# Patient Record
Sex: MALE | Race: WHITE | NOT HISPANIC OR LATINO | Employment: FULL TIME | URBAN - METROPOLITAN AREA
[De-identification: names, ages, dates, MRNs, and addresses within clinical notes are randomized per-mention and may not be internally consistent; named-entity substitution may affect disease eponyms.]

---

## 2020-01-14 ENCOUNTER — OFFICE VISIT (OUTPATIENT)
Dept: URGENT CARE | Facility: CLINIC | Age: 70
End: 2020-01-14
Payer: COMMERCIAL

## 2020-01-14 VITALS
TEMPERATURE: 98 F | WEIGHT: 230 LBS | HEIGHT: 72 IN | HEART RATE: 63 BPM | BODY MASS INDEX: 31.15 KG/M2 | OXYGEN SATURATION: 98 % | RESPIRATION RATE: 16 BRPM | DIASTOLIC BLOOD PRESSURE: 78 MMHG | SYSTOLIC BLOOD PRESSURE: 136 MMHG

## 2020-01-14 DIAGNOSIS — S63.289A DISLOCATION OF FINGER PIP JOINT, INITIAL ENCOUNTER: Primary | ICD-10-CM

## 2020-01-14 DIAGNOSIS — S69.91XA INJURY OF RIGHT RING FINGER, INITIAL ENCOUNTER: ICD-10-CM

## 2020-01-14 PROCEDURE — 99202 PR OFFICE/OUTPT VISIT, NEW, LEVL II, 15-29 MIN: ICD-10-PCS | Mod: S$GLB,,, | Performed by: STUDENT IN AN ORGANIZED HEALTH CARE EDUCATION/TRAINING PROGRAM

## 2020-01-14 PROCEDURE — 73140 X-RAY EXAM OF FINGER(S): CPT | Mod: FY,RT,S$GLB, | Performed by: RADIOLOGY

## 2020-01-14 PROCEDURE — 99202 OFFICE O/P NEW SF 15 MIN: CPT | Mod: S$GLB,,, | Performed by: STUDENT IN AN ORGANIZED HEALTH CARE EDUCATION/TRAINING PROGRAM

## 2020-01-14 PROCEDURE — 73140 XR FINGER 2 OR MORE VIEWS: ICD-10-PCS | Mod: FY,RT,S$GLB, | Performed by: RADIOLOGY

## 2020-01-14 RX ORDER — IBUPROFEN 200 MG
400 TABLET ORAL
Status: COMPLETED | OUTPATIENT
Start: 2020-01-14 | End: 2020-01-14

## 2020-01-14 RX ADMIN — Medication 400 MG: at 12:01

## 2020-01-14 NOTE — PATIENT INSTRUCTIONS
Finger Dislocation  A finger dislocation occurs when the tissues, or ligaments, that hold the joint together are torn. The bones then move apart, or are dislocated, out of their normal position. This causes pain, swelling, and bruising. Sometimes there is also a small chip fracture. Once the joint is put back into place again, it will take about 6 weeks for the ligaments to heal. During this time, you should protect your finger from re-injury.     Buddy taping is a way to secure your injured finger to the one next to it. Buddy taping allows the joint to move. But it also protects it from dislocating again. Buddy tape can be left in place for up to 6 weeks.  Hand exercises may be prescribed at your follow-up visit. These can help speed healing and maintain function. In most cases you will regain full function of your finger. But it may take 12 to 18 months before all mild pain and swelling goes away and full function returns.  Home care  · Keep your hand raised, or elevated, to reduce pain and swelling. When sitting or lying down, raise your arm above the level of your heart. You can do this by placing your arm on a pillow that rests on your chest. Or your arm can be on a pillow at your side. This is most important during the first 48 hours after injury.  · Put an ice pack on the injured area for no more than 15 to 20 minutes every 3 to 6 hours. Do this for the first 24 to 48 hours. You can make an ice pack by wrapping a plastic Ziploc bag of ice cubes in a thin towel. As the ice melts, be careful that the tape, gauze, or splint doesnt get wet. After that, keep using ice as needed to ease pain and swelling.  · If you have a removable splint, you may take it off to bathe and then put it back on. If you have a permanent splint, cover your entire hand with 2 plastic bags. Place 1 bag around the other. Tape each bag with duct tape at the top end. Water can still leak in even when your hand is covered. So it's best to  keep the splint away from water. If a splint gets wet, you can dry it with a hair-dryer on a cool setting.   · If you use nani tape and it becomes wet or dirty, change it. You may replace it with paper, plastic, or cloth tape. Cloth tape and paper tapes must be kept dry. When re-applying nani tape, use gauze or cotton padding between your fingers. This will prevent the skin from getting moist and breaking down, or macerating. It is very important to put padding at the web space. This is the small piece of skin that joins the bases of your fingers. Keep the nani tape in place as instructed by your healthcare provider.  · You may use over-the-counter pain medicine to control pain, unless another pain medicine was prescribed. Talk with your provider before taking these medicines if you have chronic liver or kidney disease. Also talk with your provider if you have ever had a stomach ulcer or GI (gastrointestinal) bleeding.  · Do not play sports or do any physical exercise until your healthcare provider says that you can.  Follow-up care  Follow up with your healthcare provider in 1 week, or as advised. Splints should generally not be left in place longer than 3 weeks to avoid stiffness and loss of joint function. It is important that you see the referral doctor. This provider can determine how long to keep your splint in place and when to begin hand exercises.  If X-rays were taken, you will be told of any new findings that may affect your care.  When to seek medical advice  Call your healthcare provider right away if any of the following occur:  · The injured finger has more pain or swelling  · The injured finger becomes red or warm  · The injured finger becomes cold, blue, numb, or tingly  Date Last Reviewed: 11/23/2015  © 9572-8719 [employer]. [address] [place] [address], [place] [address]. All rights reserved. This information is not intended as a substitute for professional medical care. Always follow  your healthcare professional's instructions.

## 2020-01-14 NOTE — LETTER
January 14, 2020      [facility]  [address] [address]  Phone: [phone]  Fax: [phone]       Patient: Eric Yan   YOB: 1950  Date of Visit: 01/14/2020    To Whom It May Concern:    JOAO Yan  was at [facility] on 01/14/2020. He has a finger injury that requires a metallic finger brace for treatment and should not be removed. If you have any questions or concerns, or if I can be of further assistance, please do not hesitate to contact me.    Sincerely,    Moises Flores MD

## 2020-01-14 NOTE — PROGRESS NOTES
Subjective:       Patient ID: Eric Yan is a 69 y.o. male.    Vitals:  height is 6' (1.829 m) and weight is 104.3 kg (230 lb). His oral temperature is 98.3 °F (36.8 °C). His blood pressure is 136/78 and his pulse is 63. His respiration is 16 and oxygen saturation is 98%.     Chief Complaint: Finger Pain    Patient c/o right 4th finger pain that occurred yesterday after falling.  He has been taking OTC Ibuprofen for her pain, and states mild relief.  His last dose of Ibuprofen was 3 hours ago.    Finger Pain   This is a new problem. The current episode started yesterday. The problem occurs constantly. The problem has been unchanged. Associated symptoms include arthralgias and joint swelling. Pertinent negatives include no abdominal pain, anorexia, change in bowel habit, chest pain, chills, congestion, coughing, diaphoresis, fatigue, fever, headaches, myalgias, nausea, neck pain, numbness, rash, sore throat, swollen glands, urinary symptoms, vertigo, visual change, vomiting or weakness. The symptoms are aggravated by bending and twisting. He has tried NSAIDs for the symptoms. The treatment provided mild relief.       Constitution: Negative for chills, sweating, fatigue and fever.   HENT: Negative for facial swelling, facial trauma, congestion and sore throat.    Neck: Negative for neck pain and neck stiffness.   Cardiovascular: Negative for chest trauma and chest pain.   Eyes: Negative for eye trauma, double vision and blurred vision.   Respiratory: Negative for cough.    Gastrointestinal: Negative for abdominal trauma, abdominal pain, nausea, vomiting and rectal bleeding.   Genitourinary: Negative for hematuria, genital trauma and pelvic pain.   Musculoskeletal: Positive for pain, trauma, joint pain, joint swelling and abnormal ROM of joint. Negative for pain with walking and muscle ache.   Skin: Negative for color change, rash, wound, abrasion, laceration and erythema.   Neurological: Negative for dizziness,  history of vertigo, light-headedness, coordination disturbances, headaches, altered mental status, loss of consciousness and numbness.   Hematologic/Lymphatic: Negative for history of bleeding disorder.   Psychiatric/Behavioral: Negative for altered mental status.       Objective:       Vitals:    01/14/20 1041   BP: 136/78   Pulse: 63   Resp: 16   Temp: 98.3 °F (36.8 °C)   TempSrc: Oral   SpO2: 98%   Weight: 104.3 kg (230 lb)   Height: 6' (1.829 m)     Physical Exam   Constitutional: He is oriented to person, place, and time. He appears well-developed and well-nourished. No distress.   HENT:   Head: Normocephalic and atraumatic.   Right Ear: External ear normal.   Left Ear: External ear normal.   Nose: Nose normal.   Eyes: EOM are normal.   Cardiovascular: Normal rate, regular rhythm and intact distal pulses.   Musculoskeletal: He exhibits edema and tenderness. He exhibits no deformity.   Edema and ecchymosis of R 4th phalanx with TTP over PIP and limited flexion/extension; sensation intact, str limited 2/2 pain   Neurological: He is alert and oriented to person, place, and time. No cranial nerve deficit (CN II-XII grossly intact) or sensory deficit. He exhibits normal muscle tone.   Skin: Skin is warm, dry and no rash. erythema  Psychiatric: He has a normal mood and affect. His behavior is normal. Judgment and thought content normal.   Nursing note and vitals reviewed.    XR R 4th Finger (1/3): dorsal dislocation of PIP with no evidence of fracture ([facility] physician interpretation)    XR R 4th Finger (2/3): continued dislocation of PIP with no evidence of fracture ([facility] physician interpretation)    XR R 4th Finger (3/3): PIP appears relocated with no evidence of fracture ([facility] physician interpretation)        Assessment:       1. Dislocation of finger PIP joint, initial encounter    2. Injury of right ring finger, initial encounter        Plan:         Dislocation of finger PIP joint, initial encounter  -     XR FINGER 2  OR MORE VIEWS; Future; Expected date: 01/14/2020  -     XR FINGER 2 OR MORE VIEWS; Future; Expected date: 01/14/2020  -     ibuprofen tablet 400 mg  - placed in dorsal splint at 30 deg and to follow-up with PCP or hand specialist in 2-3 days for re-evaluation due to limited RoM continued after relocation    Injury of right ring finger, initial encounter  -     XR FINGER 2 OR MORE VIEWS; Future; Expected date: 01/14/2020  -     ibuprofen tablet 400 mg    Results, medications and diagnosis reviewed with patient, questions answered, and return precautions given    Follow up in about 3 days (around 1/17/2020) for re-evaluation with PCP or Orthopedics.    Moises Flores MD/MPH  [facility]  [facility]     Addendum: After patient discharged, radiology read of final XR with possible avulsion fracture. Discussed results with patient over the phone and counseled to follow-up as previously discussed for re-examination and further management. Questions answered and pt expressed understanding

## 2020-01-17 ENCOUNTER — TELEPHONE (OUTPATIENT)
Dept: URGENT CARE | Facility: CLINIC | Age: 70
End: 2020-01-17